# Patient Record
Sex: MALE | Race: WHITE | NOT HISPANIC OR LATINO | Employment: FULL TIME | URBAN - METROPOLITAN AREA
[De-identification: names, ages, dates, MRNs, and addresses within clinical notes are randomized per-mention and may not be internally consistent; named-entity substitution may affect disease eponyms.]

---

## 2022-12-28 ENCOUNTER — PREP FOR SURGERY (OUTPATIENT)
Dept: OTHER | Facility: HOSPITAL | Age: 52
End: 2022-12-28

## 2022-12-28 ENCOUNTER — OFFICE VISIT (OUTPATIENT)
Dept: SURGERY | Facility: CLINIC | Age: 52
End: 2022-12-28

## 2022-12-28 VITALS — WEIGHT: 286 LBS | BODY MASS INDEX: 35.56 KG/M2 | RESPIRATION RATE: 16 BRPM | HEIGHT: 75 IN

## 2022-12-28 DIAGNOSIS — K21.9 GASTROESOPHAGEAL REFLUX DISEASE, UNSPECIFIED WHETHER ESOPHAGITIS PRESENT: ICD-10-CM

## 2022-12-28 DIAGNOSIS — M62.08 RECTUS DIASTASIS: Primary | ICD-10-CM

## 2022-12-28 DIAGNOSIS — K42.9 UMBILICAL HERNIA WITHOUT OBSTRUCTION AND WITHOUT GANGRENE: ICD-10-CM

## 2022-12-28 DIAGNOSIS — K21.9 GERD WITHOUT ESOPHAGITIS: Primary | ICD-10-CM

## 2022-12-28 PROCEDURE — 99203 OFFICE O/P NEW LOW 30 MIN: CPT | Performed by: SURGERY

## 2022-12-28 RX ORDER — PRAMIPEXOLE DIHYDROCHLORIDE 0.5 MG/1
TABLET ORAL
COMMUNITY
Start: 2022-12-11

## 2022-12-28 RX ORDER — PANTOPRAZOLE SODIUM 20 MG/1
20 TABLET, DELAYED RELEASE ORAL DAILY
Qty: 30 TABLET | Refills: 1 | Status: SHIPPED | OUTPATIENT
Start: 2022-12-28 | End: 2022-12-30

## 2022-12-28 RX ORDER — LOSARTAN POTASSIUM 100 MG/1
100 TABLET ORAL DAILY
COMMUNITY
Start: 2020-12-28

## 2022-12-28 RX ORDER — DICLOFENAC SODIUM 75 MG/1
TABLET, DELAYED RELEASE ORAL
COMMUNITY
Start: 2020-12-28

## 2022-12-28 RX ORDER — HYDROCHLOROTHIAZIDE 12.5 MG/1
TABLET ORAL
COMMUNITY
Start: 2022-08-01

## 2022-12-28 RX ORDER — CITALOPRAM 40 MG/1
TABLET ORAL
COMMUNITY
Start: 2020-12-28

## 2022-12-30 DIAGNOSIS — M62.08 RECTUS DIASTASIS: ICD-10-CM

## 2022-12-30 DIAGNOSIS — K21.9 GASTROESOPHAGEAL REFLUX DISEASE, UNSPECIFIED WHETHER ESOPHAGITIS PRESENT: Primary | ICD-10-CM

## 2022-12-30 RX ORDER — PANTOPRAZOLE SODIUM 20 MG/1
20 TABLET, DELAYED RELEASE ORAL DAILY
Qty: 30 TABLET | Refills: 1 | Status: SHIPPED | OUTPATIENT
Start: 2022-12-30 | End: 2023-12-30

## 2022-12-30 NOTE — TELEPHONE ENCOUNTER
I have changed the pharmacy in the patient chart. I called pt to confirm that was the correct pharmacy. Can you please re-send the Protonix. I did pend the medication for you, just as you had it prescribed before.

## 2022-12-30 NOTE — TELEPHONE ENCOUNTER
Hub staff attempted to follow warm transfer process and was unsuccessful     Caller: George Steel    Relationship to patient: Self    Best call back number: [phone]  Patient is needing: PT CALLED STATES WE SEND PRESCRIPTION TO WRONG PHARMACY,  (pantoprazole (Protonix) 20 MG EC tablet [[id number]] (Order [id number])    SEND TO [facility]UB[facility]N [facility] IN [facility] [phone]

## 2022-12-30 NOTE — PROGRESS NOTES
General Surgery/Colorectal Surgery Note    Patient Name:  George Steel  YOB: 1970  [id number]    Referring Provider: Referring, Self      Patient Care Team:  Michael Barnes APRN as PCP - General (Family Medicine)  Raoul Villalpando MD as Consulting Physician (General Surgery)    Chief complaint hernia    Subjective .     History of present illness:   He comes in for 6-month history of a bulge to his upper abdomen.  No pain to the site.  No history of the same.  No previous abdominal surgery.  No tobacco use.  No recent chest pain.  No blood thinner use.  No imaging.  History of reflux causing him to have to sleep sitting up.  He takes Tums with some relief.  Recent Cologuard testing negative.  No previous upper endoscopy.        History:  Past Medical History:   Diagnosis Date   • Hypertension 2020       History reviewed. No pertinent surgical history.    History reviewed. No pertinent family history.    Social History     Tobacco Use   • Smoking status: Never   Substance Use Topics   • Alcohol use: Yes     Alcohol/week: 3.0 standard drinks     Types: 3 Cans of beer per week   • Drug use: Never       Review of Systems  All systems were reviewed and negative except for:   Review of Systems   Constitutional: Negative for chills, fever and unexpected weight loss.   HENT: Negative for congestion, nosebleeds and voice change.    Eyes: Negative for blurred vision, double vision and discharge.   Respiratory: Negative for apnea, chest tightness and shortness of breath.    Cardiovascular: Negative for chest pain and leg swelling.   Gastrointestinal:        See HPI   Endocrine: Negative for cold intolerance and heat intolerance.   Genitourinary: Negative for dysuria, hematuria and urgency.   Musculoskeletal: Negative for back pain, joint swelling and neck pain.   Skin: Negative for color change and dry skin.   Neurological: Negative for dizziness and confusion.   Hematological: Negative for  adenopathy.   Psychiatric/Behavioral: Negative for agitation and behavioral problems.     MEDS:  Prior to Admission medications    Medication Sig Start Date End Date Taking? Authorizing Provider   citalopram (CeleXA) 40 MG tablet  12/28/20  Yes Mian Tran MD   diclofenac (VOLTAREN) 75 MG EC tablet  12/28/20  Yes Provider, MD Mian   hydroCHLOROthiazide (HYDRODIURIL) 12.5 MG tablet  8/1/22  Yes ProviderMian MD   losartan (COZAAR) 100 MG tablet Take 100 mg by mouth Daily. 12/28/20  Yes ProviderMian MD   pramipexole (MIRAPEX) 0.5 MG tablet  12/11/22  Yes ProviderMian MD   pantoprazole (Protonix) 20 MG EC tablet Take 1 tablet by mouth Daily. 12/28/22 12/28/23  Raoul Villalpando MD        Allergies:  Patient has no known allergies.    Objective     Vital Signs        Physical Exam:     General Appearance:    Alert, cooperative, in no acute distress   Head:    Normocephalic, without obvious abnormality, atraumatic   Eyes:          Conjunctivae and sclerae normal, no icterus,     Ears:    Ears appear intact with no abnormalities noted   Throat:   No oral lesions, no thrush, oral mucosa moist   Neck:   No adenopathy, supple, trachea midline, no thyromegaly   Back:     No kyphosis present, no scoliosis present, no skin lesions,      erythema or scars, no tenderness to percussion or                   palpation,   range of motion normal   Lungs:     Clear to auscultation,respirations regular, even and                  unlabored    Heart:    Regular rhythm and normal rate, normal S1 and S2, no            murmur, no gallop, no rub, no click   Chest Wall:    No abnormalities observed   Abdomen:     Normal bowel sounds, no masses, no organomegaly, soft        non-tender, non-distended, no guarding, no rebound                tenderness, reducible umbilical hernia without evidence of obstruction or gangrene, epigastric diastases with no obvious fascial defect   Rectal:       "  Extremities:   Moves all extremities well, no edema, no cyanosis, no             redness   Pulses:   Pulses palpable and equal bilaterally   Skin:   No bleeding, bruising or rash   Lymph nodes:   No palpable adenopathy   Neurologic:   A/o x 4 with no deficits       Results Review:   {Results Review:34479::\"I reviewed the patient's new clinical results.\"    LABS/IMAGING:  No results found for this or any previous visit.     Result Review :     Assessment & Plan     Rectus diastases  Initial umbilical hernia reducible without evidence of obstruction or gangrene  GERD    Discussion with patient and family.  I explained to him what rectus diastases was.  We discussed imaging with CT abdomen without contrast to evaluate for fascial defect as well as possible referral to plastic surgery.  He does not wish to proceed with surgery at this time.  I encouraged him to wear an abdominal binder.  For his umbilical hernia I discussed the warning signs of incarceration and strangulation.  He was instructed to go to emergency department for any concern.  We discussed observation versus surgery.  He wishes to proceed with observation.  For his reflux I will give him a prescription for Protonix.  I recommended upper endoscopy to evaluate his reflux.  Benefits and alternatives discussed.  Risk procedure including bleeding and perforation discussed.  He agrees with the plan.  Orders placed.  Thank for the consult.             This document has been electronically signed by Raoul Villalpando MD  December 30, 2022 09:17 EST  "

## 2023-01-04 DIAGNOSIS — M62.08 RECTUS DIASTASIS: Primary | ICD-10-CM

## 2023-01-26 ENCOUNTER — TELEPHONE (OUTPATIENT)
Dept: SURGERY | Facility: CLINIC | Age: 53
End: 2023-01-26
Payer: COMMERCIAL

## 2023-01-26 NOTE — TELEPHONE ENCOUNTER
ALEX CALLED FROM [facility].  PATIENT IS SCHEDULED FOR A CT PELVIS ON 01/27/23.  SHE SAID THE PROCEDURE IS APPROVED BUT THE SITE IS STILL PENDING.    IF IT IS NOT APPROVED BY TODAY AT 3:00, CAN IT BE RESCHEDULED?

## 2023-03-14 ENCOUNTER — TELEPHONE (OUTPATIENT)
Dept: SURGERY | Facility: CLINIC | Age: 53
End: 2023-03-14
Payer: COMMERCIAL

## 2023-03-14 NOTE — TELEPHONE ENCOUNTER
Patient wife called wanted to r/s patients egd. Was able to speak with amarilis in surgery scheduling to move patient.

## 2023-04-25 ENCOUNTER — TELEPHONE (OUTPATIENT)
Dept: SURGERY | Facility: CLINIC | Age: 53
End: 2023-04-25
Payer: COMMERCIAL

## 2023-04-25 NOTE — TELEPHONE ENCOUNTER
Called and spoke with patient and let her know that nettie was scheduled for the EGD to evaluate his reflux and the CT scan was to evaluate his hernia. Advised to keep the EGD and proceed with CT scan and Dr. Villalpando can review results from both at patients F/U visit. Pt wife v/u and had no further questions. Will call when CT is scheduled.

## 2023-04-25 NOTE — TELEPHONE ENCOUNTER
Scheduled patients ct scan on 5/16 @ 5:30 p.m with a 515 arrival time to the hospital. Advised to bring insurance card and photo id. Information relayed to kit with no further questions or concerns.

## 2023-04-25 NOTE — TELEPHONE ENCOUNTER
PATIENT'S WIFE CALLED AND WANTS TO KNOW IF PATIENT NEEDS CT PRIOR TO SCOPE.  SHE SAID SHE HAD PREVIOUSLY CALLED THE OFFICE AND ASKED FOR THE CT AND THE SCOPE TO BE RESCHEDULED TO [place].      SHE SAID PATIENT WAS TO HAVE CT, FOLLOW UP WITH DR. NEGRETE, THEN HAVE THE SCOPE.  DOES SHE NEED TO CALL AND RESCHEDULE THE CT, AND THEN CALL BACK AND RESCHEDULE THE SCOPE?    PLEASE REVIEW PREVIOUS PHONE ENCOUNTERS AND MY CHART MESSAGES.      SHE SAID SHE APOLOGIZES, BUT SHE CALLED, PREVIOUSLY.  SHE HAD MEDICAL ISSUE HERSELF, WHERE THEY HAD BEEN UNABLE TO DO ANYTHING.

## 2023-05-01 ENCOUNTER — PREP FOR SURGERY (OUTPATIENT)
Dept: OTHER | Facility: HOSPITAL | Age: 53
End: 2023-05-01
Payer: COMMERCIAL

## 2023-05-02 ENCOUNTER — ANESTHESIA (OUTPATIENT)
Dept: GASTROENTEROLOGY | Facility: HOSPITAL | Age: 53
End: 2023-05-02
Payer: COMMERCIAL

## 2023-05-02 ENCOUNTER — HOSPITAL ENCOUNTER (OUTPATIENT)
Facility: HOSPITAL | Age: 53
Setting detail: HOSPITAL OUTPATIENT SURGERY
Discharge: HOME | End: 2023-05-02
Attending: SURGERY | Admitting: SURGERY
Payer: COMMERCIAL

## 2023-05-02 ENCOUNTER — ANESTHESIA EVENT (OUTPATIENT)
Dept: GASTROENTEROLOGY | Facility: HOSPITAL | Age: 53
End: 2023-05-02
Payer: COMMERCIAL

## 2023-05-02 VITALS
DIASTOLIC BLOOD PRESSURE: 55 MMHG | HEIGHT: 76 IN | SYSTOLIC BLOOD PRESSURE: 132 MMHG | HEART RATE: 66 BPM | OXYGEN SATURATION: 98 % | RESPIRATION RATE: 18 BRPM | BODY MASS INDEX: 34.63 KG/M2 | WEIGHT: 284.39 LBS | TEMPERATURE: 97.6 F

## 2023-05-02 DIAGNOSIS — K21.9 GERD WITHOUT ESOPHAGITIS: ICD-10-CM

## 2023-05-02 PROCEDURE — 88305 TISSUE EXAM BY PATHOLOGIST: CPT | Performed by: SURGERY

## 2023-05-02 PROCEDURE — 25010000002 PROPOFOL 10 MG/ML EMULSION: Performed by: NURSE ANESTHETIST, CERTIFIED REGISTERED

## 2023-05-02 RX ORDER — PROPOFOL 10 MG/ML
VIAL (ML) INTRAVENOUS AS NEEDED
Status: DISCONTINUED | OUTPATIENT
Start: 2023-05-02 | End: 2023-05-02 | Stop reason: SURG

## 2023-05-02 RX ORDER — LIDOCAINE HYDROCHLORIDE 20 MG/ML
INJECTION, SOLUTION EPIDURAL; INFILTRATION; INTRACAUDAL; PERINEURAL AS NEEDED
Status: DISCONTINUED | OUTPATIENT
Start: 2023-05-02 | End: 2023-05-02 | Stop reason: SURG

## 2023-05-02 RX ORDER — SODIUM CHLORIDE, SODIUM LACTATE, POTASSIUM CHLORIDE, CALCIUM CHLORIDE 600; 310; 30; 20 MG/100ML; MG/100ML; MG/100ML; MG/100ML
30 INJECTION, SOLUTION INTRAVENOUS CONTINUOUS
Status: DISCONTINUED | OUTPATIENT
Start: 2023-05-02 | End: 2023-05-02 | Stop reason: HOSPADM

## 2023-05-02 RX ADMIN — PROPOFOL 100 MG: 10 INJECTION, EMULSION INTRAVENOUS at 12:03

## 2023-05-02 RX ADMIN — SODIUM CHLORIDE, POTASSIUM CHLORIDE, SODIUM LACTATE AND CALCIUM CHLORIDE 30 ML/HR: 600; 310; 30; 20 INJECTION, SOLUTION INTRAVENOUS at 10:51

## 2023-05-02 RX ADMIN — LIDOCAINE HYDROCHLORIDE 50 MG: 20 INJECTION, SOLUTION EPIDURAL; INFILTRATION; INTRACAUDAL; PERINEURAL at 11:59

## 2023-05-02 RX ADMIN — PROPOFOL 250 MCG/KG/MIN: 10 INJECTION, EMULSION INTRAVENOUS at 11:59

## 2023-05-02 RX ADMIN — PROPOFOL 200 MG: 10 INJECTION, EMULSION INTRAVENOUS at 11:59

## 2023-05-02 NOTE — H&P
General Surgery/Colorectal Surgery Note    Patient Name:  George Steel  YOB: 1970  [id number]    Referring Provider: Raoul Villalpando, *      Patient Care Team:  Michael Barnes APRN as PCP - General (Family Medicine)  Raoul Villalpando MD as Consulting Physician (General Surgery)    Subjective .     History of present illness:    HPI   History of reflux causing him to have to sleep sitting up.  He takes Tums with some relief.  No previous upper endoscopy.    History:  Past Medical History:   Diagnosis Date   • Hypertension 2020       No past surgical history on file.    No family history on file.    Social History     Tobacco Use   • Smoking status: Never   Substance Use Topics   • Alcohol use: Yes     Alcohol/week: 3.0 standard drinks     Types: 3 Cans of beer per week   • Drug use: Never       Review of Systems: See HPI    Review of Systems            Medications Prior to Admission   Medication Sig Dispense Refill Last Dose   • citalopram (CeleXA) 40 MG tablet       • diclofenac (VOLTAREN) 75 MG EC tablet       • hydroCHLOROthiazide (HYDRODIURIL) 12.5 MG tablet       • losartan (COZAAR) 100 MG tablet Take 100 mg by mouth Daily.      • pantoprazole (Protonix) 20 MG EC tablet Take 1 tablet by mouth Daily. 30 tablet 1    • pramipexole (MIRAPEX) 0.5 MG tablet             Home Meds:      Prior to Admission medications    Medication Sig Start Date End Date Taking? Authorizing Provider   citalopram (CeleXA) 40 MG tablet  12/28/20   Mian Tran MD   diclofenac (VOLTAREN) 75 MG EC tablet  12/28/20   ProviderMian MD   hydroCHLOROthiazide (HYDRODIURIL) 12.5 MG tablet  8/1/22   Mian Tran MD   losartan (COZAAR) 100 MG tablet Take 100 mg by mouth Daily. 12/28/20   Mian Tran MD   pantoprazole (Protonix) 20 MG EC tablet Take 1 tablet by mouth Daily. 12/30/22 12/30/23  Raoul Villalpando MD   pramipexole (MIRAPEX) 0.5 MG tablet  12/11/22   Provider  Historical, MD            Allergies:  Patient has no known allergies.      Objective     Vital Signs        Physical Exam:     Physical Exam    NAD, A/O x 4, normal circulation, normal respiration      Result Review :     Assessment & Plan     There are no diagnoses linked to this encounter.       Risks including bleeding, perforation, pain, infection, missed polyp(s). Benefits, and alternatives of EGD discussed with patient.  All questions answered.  Consent verified.         Raoul Villalpando MD  05/02/23 10:30 EDT

## 2023-05-02 NOTE — ANESTHESIA PREPROCEDURE EVALUATION
Anesthesia Evaluation     Patient summary reviewed and Nursing notes reviewed   no history of anesthetic complications:  NPO Solid Status: > 8 hours  NPO Liquid Status: > 2 hours           Airway   Mallampati: II  TM distance: >3 FB  Neck ROM: full  No difficulty expected  Dental      Pulmonary - negative pulmonary ROS and normal exam    breath sounds clear to auscultation  Cardiovascular - normal exam  Exercise tolerance: good (4-7 METS)    Rhythm: regular  Rate: normal    (+) hypertension,       Neuro/Psych- negative ROS  GI/Hepatic/Renal/Endo    (+)  GERD well controlled,      Musculoskeletal (-) negative ROS    Abdominal    Substance History - negative use     OB/GYN negative ob/gyn ROS         Other - negative ROS       ROS/Med Hx Other: PAT Nursing Notes unavailable.                   Anesthesia Plan    ASA 2     general     (Total IV Anesthesia    Patient understands anesthesia not responsible for dental damage.  )  intravenous induction     Anesthetic plan, risks, benefits, and alternatives have been provided, discussed and informed consent has been obtained with: patient.  Pre-procedure education provided  Plan discussed with CRNA.        CODE STATUS:

## 2023-05-02 NOTE — ANESTHESIA POSTPROCEDURE EVALUATION
Patient: George Steel    Procedure Summary     Date: 05/02/23 Room / Location: [facility] ENDOSCOPY 2 / [facility] ENDOSCOPY    Anesthesia Start: 1153 Anesthesia Stop: 1213    Procedure: ESOPHAGOGASTRODUODENOSCOPY WITH BIOPSIES Diagnosis:       GERD without esophagitis      (GERD without esophagitis [K21.9])    Surgeons: Raoul Villalpando MD Provider: Christian Cano MD    Anesthesia Type: general ASA Status: 2          Anesthesia Type: general    Vitals  Vitals Value Taken Time   /55 05/02/23 1227   Temp 36.4 °C (97.6 °F) 05/02/23 1232   Pulse 66 05/02/23 1232   Resp 18 05/02/23 1232   SpO2 98 % 05/02/23 1232           Post Anesthesia Care and Evaluation    Patient location during evaluation: bedside  Patient participation: complete - patient participated  Level of consciousness: awake  Pain management: adequate    Airway patency: patent  PONV Status: none  Cardiovascular status: acceptable and stable  Respiratory status: acceptable  Hydration status: acceptable    Comments: An Anesthesiologist personally participated in the most demanding procedures (including induction and emergence if applicable) in the anesthesia plan, monitored the course of anesthesia administration at frequent intervals and remained physically present and available for immediate diagnosis and treatment of emergencies.

## 2023-05-03 LAB
CYTO UR: NORMAL
LAB AP CASE REPORT: NORMAL
LAB AP CLINICAL INFORMATION: NORMAL
PATH REPORT.FINAL DX SPEC: NORMAL
PATH REPORT.GROSS SPEC: NORMAL

## 2023-05-16 ENCOUNTER — HOSPITAL ENCOUNTER (OUTPATIENT)
Dept: CT IMAGING | Facility: HOSPITAL | Age: 53
Discharge: HOME | End: 2023-05-16
Admitting: SURGERY
Payer: COMMERCIAL

## 2023-05-16 DIAGNOSIS — M62.08 RECTUS DIASTASIS: ICD-10-CM

## 2023-05-16 PROCEDURE — 74150 CT ABDOMEN W/O CONTRAST: CPT

## 2023-05-18 ENCOUNTER — OFFICE VISIT (OUTPATIENT)
Dept: SURGERY | Facility: CLINIC | Age: 53
End: 2023-05-18
Payer: COMMERCIAL

## 2023-05-18 VITALS — BODY MASS INDEX: 36.07 KG/M2 | HEIGHT: 76 IN | WEIGHT: 296.2 LBS

## 2023-05-18 DIAGNOSIS — K21.00 GASTROESOPHAGEAL REFLUX DISEASE WITH ESOPHAGITIS WITHOUT HEMORRHAGE: ICD-10-CM

## 2023-05-18 DIAGNOSIS — K42.9 UMBILICAL HERNIA WITHOUT OBSTRUCTION AND WITHOUT GANGRENE: Primary | ICD-10-CM

## 2023-05-19 NOTE — PROGRESS NOTES
General Surgery/Colorectal Surgery Note    Patient Name:  George Steel  YOB: 1970  [id number]    Referring Provider: No ref. provider found      Patient Care Team:  Michael Barnes APRN as PCP - General (Family Medicine)  Raoul Villalpando MD as Consulting Physician (General Surgery)    Chief complaint follow-up    Subjective .     History of present illness:    Previously seen. 6-month history of a bulge to his upper abdomen.  No pain to the site.  No history of the same.  No previous abdominal surgery.  No tobacco use.  No recent chest pain.  No blood thinner use.  No imaging.  History of reflux causing him to have to sleep sitting up.  He takes Tums with some relief.  Recent Cologuard testing negative.  No previous upper endoscopy.    CT abdomen pelvis 5/16/2023 with rectus diastases with a 2 cm umbilical hernia containing small bowel loops, small bilateral inguinal hernias containing fat    Status post EGD 5/2/2023 with small hiatal hernia, grade a esophagitis  Biopsy duodenum with chronic duodenitis, antral biopsy negative for H. pylori, GE junction with erosion and reflux esophagitis negative for metaplasia    He was started on Protonix.    He comes in for follow-up.  His reflux is much improved on his reflux medication.  He wishes to discuss umbilical hernia repair.  No changes in health or medications since last seen.        History:  Past Medical History:   Diagnosis Date   • Hypertension 2020       Past Surgical History:   Procedure Laterality Date   • ENDOSCOPY N/A 5/2/2023    Procedure: ESOPHAGOGASTRODUODENOSCOPY WITH BIOPSIES;  Surgeon: Raoul Villalpando MD;  Location: [facility] ENDOSCOPY;  Service: General;  Laterality: N/A;  SAME       Family History   Problem Relation Age of Onset   • Diabetes Mother    • Diabetes Father        Social History     Tobacco Use   • Smoking status: Never   • Smokeless tobacco: Former   Vaping Use   • Vaping Use: Never used   Substance Use  Topics   • Alcohol use: Yes     Alcohol/week: 3.0 standard drinks     Types: 3 Cans of beer per week   • Drug use: Never       Review of Systems  All systems were reviewed and negative except for:   Review of Systems   Constitutional: Negative for chills, fever and unexpected weight loss.   HENT: Negative for congestion, nosebleeds and voice change.    Eyes: Negative for blurred vision, double vision and discharge.   Respiratory: Negative for apnea, chest tightness and shortness of breath.    Cardiovascular: Negative for chest pain and leg swelling.   Gastrointestinal:        See HPI   Endocrine: Negative for cold intolerance and heat intolerance.   Genitourinary: Negative for dysuria, hematuria and urgency.   Musculoskeletal: Negative for back pain, joint swelling and neck pain.   Skin: Negative for color change and dry skin.   Neurological: Negative for dizziness and confusion.   Hematological: Negative for adenopathy.   Psychiatric/Behavioral: Negative for agitation and behavioral problems.     MEDS:  Prior to Admission medications    Medication Sig Start Date End Date Taking? Authorizing Provider   citalopram (CeleXA) 40 MG tablet  12/28/20  Yes ProviderMian MD   diclofenac (VOLTAREN) 75 MG EC tablet  12/28/20  Yes Mian Tran MD   hydroCHLOROthiazide (HYDRODIURIL) 12.5 MG tablet  8/1/22  Yes ProviderMian MD   losartan (COZAAR) 100 MG tablet Take 1 tablet by mouth Daily. 12/28/20  Yes Mian Tran MD   pantoprazole (Protonix) 20 MG EC tablet Take 1 tablet by mouth Daily. 12/30/22 12/30/23 Yes Raoul Villalpando MD   pramipexole (MIRAPEX) 0.5 MG tablet  12/11/22  Yes ProviderMian MD        Allergies:  Patient has no known allergies.    Objective     Vital Signs        Physical Exam:     General Appearance:    Alert, cooperative, in no acute distress   Head:    Normocephalic, without obvious abnormality, atraumatic   Eyes:          Conjunctivae and sclerae normal,  "no icterus,     Ears:    Ears appear intact with no abnormalities noted   Throat:   No oral lesions, no thrush, oral mucosa moist   Neck:   No adenopathy, supple, trachea midline, no thyromegaly   Back:     No kyphosis present, no scoliosis present, no skin lesions,      erythema or scars, no tenderness to percussion or                   palpation,   range of motion normal   Lungs:     Clear to auscultation,respirations regular, even and                  unlabored    Heart:    Regular rhythm and normal rate, normal S1 and S2, no            murmur, no gallop, no rub, no click   Chest Wall:    No abnormalities observed   Abdomen:     Normal bowel sounds, no masses, no organomegaly, soft        non-tender, non-distended, no guarding, no rebound                tenderness, reducible umbilical hernia without evidence of obstruction or gangrene   Rectal:        Extremities:   Moves all extremities well, no edema, no cyanosis, no             redness   Pulses:   Pulses palpable and equal bilaterally   Skin:   No bleeding, bruising or rash   Lymph nodes:   No palpable adenopathy   Neurologic:   A/o x 4 with no deficits       Results Review:   {Results Review:20512::\"I reviewed the patient's new clinical results.\"    LABS/IMAGING:  Results for orders placed or performed during the hospital encounter of 05/02/23   Tissue Pathology Exam    Specimen: A: Small Intestine, Duodenum; Tissue    B: Gastric, Antrum; Tissue    C: GE Junction; Tissue   Result Value Ref Range    Case Report       Surgical Pathology Report                         Case: [id number]                                  Authorizing Provider:  Raoul Villalpando MD  Collected:           05/02/2023 12:01 PM          Ordering Location:     [facility] Received:            05/02/2023 12:59 PM                                 SUITES                                                                       Pathologist:           Xiomara Ferrara MD     " "                                                Specimens:   1) - Small Intestine, Duodenum, DUODENUM BIOPSIES                                                   2) - Gastric, Antrum, ANTRUM BIOPSIES                                                               3) - GE Junction, GE JUNCTION BIOPSIES                                                     Clinical Information       GERD without esophagitis      Final Diagnosis       1. Duodenum, biopsy:   - Chronic duodenitis      2. Stomach, antrum, biopsy:   - Gastric antrum mucosa with no significant pathologic change   - Negative for Helicobacter pylori on routine H&E stain   - Negative for intestinal metaplasia, dysplasia and malignancy      3. Gastroesophageal junction, biopsy:   - Squamocolumnar mucosa with superficial erosion and changes consistent with reflux esophagitis   - Negative for intestinal metaplasia, dysplasia and malignancy          Gross Description       1. Small Intestine, Duodenum.  Received in formalin and labeled \" duodenum\" is a 0.5 cm fragment of tan soft tissue. The specimen is entirely submitted in one cassette.    2. Gastric, Antrum.  Received in formalin and labeled \" antrum\" is a 0.6 cm fragment of tan soft tissue. The specimen is entirely submitted in one cassette.    3. GE Junction.  Received in formalin and labeled \" GE junction\" are two fragments of tan soft tissue measuring 0.3-0.4 cm in greatest dimension. The specimen is entirely submitted in one cassette.   DORY      Microscopic Description       Microscopic examination performed.          Result Review :     Assessment & Plan     CT abdomen pelvis 5/16/2023 with rectus diastases with a 2 cm umbilical hernia containing small bowel loops, small bilateral inguinal hernias containing fat  Initial reducible umbilical hernia without evidence of obstruction or gangrene  Hiatal hernia with GERD and esophagitis  Status post EGD 5/2/2023 with small hiatal hernia, grade a esophagitis  Biopsy " duodenum with chronic duodenitis, antral biopsy negative for H. pylori, GE junction with erosion and reflux esophagitis negative for metaplasia    Discussion with patient.  I reviewed the CT images with the patient.  I discussed what a hernia was.  I discussed the warning signs of incarceration and strangulation.  He was instructed to the emergency department for any concern.  He wishes to have this repaired.  I recommended robotic possible open umbilical hernia repair with mesh.  I explained the procedure and recovery.  Benefits and alternatives discussed.  Risk procedure including risk of anesthesia, bleeding, infection, mesh infection, conversion to open, recurrence of hernia, damage to surrounding structures including bowel, heart attack, stroke, blood clot, pneumonia were discussed.  All questions answered.  He agrees with the plan.  Orders placed.  He was instructed to use chlorhexidine the night before surgery.  I reviewed the findings of the upper endoscopy and pathology.  He is pleased on his reflux surgery.  I explained to him if his reflux worsens we need to consider reflux surgery.  When he has his next colonoscopy I recommended repeat upper endoscopy for further evaluation.  All questions answered.  He agrees with the plan.  Thank for the consult.           This document has been electronically signed by Raoul Villalpando MD  May 19, 2023 09:00 EDT

## 2023-06-12 ENCOUNTER — TELEPHONE (OUTPATIENT)
Dept: SURGERY | Facility: CLINIC | Age: 53
End: 2023-06-12
Payer: COMMERCIAL

## 2023-06-12 NOTE — TELEPHONE ENCOUNTER
PATIENT'S WIFE, CARMELA, CALLED TO SCHEDULE HERNIA REPAIR.  PATIENT SAW DR. NEGRETE A MONTH AGO, AND WAS TOLD TO CALL BACK TO SCHEDULE WITHIN 3 MONTHS DUE TO INSURANCE REASONS.

## 2023-06-13 ENCOUNTER — PREP FOR SURGERY (OUTPATIENT)
Dept: SURGERY | Facility: CLINIC | Age: 53
End: 2023-06-13
Payer: COMMERCIAL

## 2023-06-13 DIAGNOSIS — K42.9 UMBILICAL HERNIA WITHOUT OBSTRUCTION AND WITHOUT GANGRENE: Primary | ICD-10-CM

## 2023-06-13 RX ORDER — SODIUM CHLORIDE, SODIUM LACTATE, POTASSIUM CHLORIDE, CALCIUM CHLORIDE 600; 310; 30; 20 MG/100ML; MG/100ML; MG/100ML; MG/100ML
50 INJECTION, SOLUTION INTRAVENOUS CONTINUOUS
OUTPATIENT
Start: 2023-06-13

## 2023-06-13 RX ORDER — SODIUM CHLORIDE 0.9 % (FLUSH) 0.9 %
10 SYRINGE (ML) INJECTION EVERY 12 HOURS SCHEDULED
OUTPATIENT
Start: 2023-06-13

## 2023-06-13 RX ORDER — SODIUM CHLORIDE 0.9 % (FLUSH) 0.9 %
10 SYRINGE (ML) INJECTION AS NEEDED
OUTPATIENT
Start: 2023-06-13

## 2023-06-13 RX ORDER — SODIUM CHLORIDE 9 MG/ML
40 INJECTION, SOLUTION INTRAVENOUS AS NEEDED
OUTPATIENT
Start: 2023-06-13

## 2023-06-13 NOTE — TELEPHONE ENCOUNTER
Called and spoke with patient and scheduled his surgery for 7/14/23. Spoke with justine in scheduling, patient will have a phone PAT. Advised nothing to eat or drink after midnight. He will need a  home. They will call with arrival time the day before surgery. Pt v/u and had no further questions.

## 2023-08-01 ENCOUNTER — OFFICE VISIT (OUTPATIENT)
Dept: SURGERY | Facility: CLINIC | Age: 53
End: 2023-08-01
Payer: COMMERCIAL

## 2023-08-01 VITALS — RESPIRATION RATE: 16 BRPM | WEIGHT: 290 LBS | BODY MASS INDEX: 36.06 KG/M2 | HEIGHT: 75 IN

## 2023-08-01 DIAGNOSIS — K42.9 UMBILICAL HERNIA WITHOUT OBSTRUCTION AND WITHOUT GANGRENE: Primary | ICD-10-CM

## 2023-08-01 PROCEDURE — 99024 POSTOP FOLLOW-UP VISIT: CPT | Performed by: SURGERY

## 2023-11-04 ENCOUNTER — APPOINTMENT (OUTPATIENT)
Dept: GENERAL RADIOLOGY | Facility: HOSPITAL | Age: 53
End: 2023-11-04
Payer: COMMERCIAL

## 2023-11-04 ENCOUNTER — HOSPITAL ENCOUNTER (EMERGENCY)
Facility: HOSPITAL | Age: 53
Discharge: HOME | End: 2023-11-04
Attending: EMERGENCY MEDICINE | Admitting: EMERGENCY MEDICINE
Payer: COMMERCIAL

## 2023-11-04 VITALS
OXYGEN SATURATION: 98 % | TEMPERATURE: 97.9 F | WEIGHT: 299.38 LBS | DIASTOLIC BLOOD PRESSURE: 87 MMHG | HEIGHT: 75 IN | SYSTOLIC BLOOD PRESSURE: 139 MMHG | RESPIRATION RATE: 12 BRPM | HEART RATE: 61 BPM | BODY MASS INDEX: 37.22 KG/M2

## 2023-11-04 DIAGNOSIS — M94.0 COSTOCHONDRITIS: Primary | ICD-10-CM

## 2023-11-04 DIAGNOSIS — R07.9 CHEST PAIN, UNSPECIFIED TYPE: ICD-10-CM

## 2023-11-04 LAB
ALBUMIN SERPL-MCNC: 4.6 G/DL (ref 3.5–5.2)
ALBUMIN/GLOB SERPL: 1.8 G/DL
ALP SERPL-CCNC: 92 U/L (ref 39–117)
ALT SERPL W P-5'-P-CCNC: 56 U/L (ref 1–41)
ANION GAP SERPL CALCULATED.3IONS-SCNC: 8 MMOL/L (ref 5–15)
AST SERPL-CCNC: 26 U/L (ref 1–40)
BASOPHILS # BLD AUTO: 0.06 10*3/MM3 (ref 0–0.2)
BASOPHILS NFR BLD AUTO: 0.8 % (ref 0–1.5)
BILIRUB SERPL-MCNC: 0.4 MG/DL (ref 0–1.2)
BUN SERPL-MCNC: 19 MG/DL (ref 6–20)
BUN/CREAT SERPL: 19.2 (ref 7–25)
CALCIUM SPEC-SCNC: 9.3 MG/DL (ref 8.6–10.5)
CHLORIDE SERPL-SCNC: 105 MMOL/L (ref 98–107)
CO2 SERPL-SCNC: 28 MMOL/L (ref 22–29)
CREAT SERPL-MCNC: 0.99 MG/DL (ref 0.76–1.27)
DEPRECATED RDW RBC AUTO: 41.1 FL (ref 37–54)
EGFRCR SERPLBLD CKD-EPI 2021: 90 ML/MIN/1.73
EOSINOPHIL # BLD AUTO: 0.09 10*3/MM3 (ref 0–0.4)
EOSINOPHIL NFR BLD AUTO: 1.2 % (ref 0.3–6.2)
ERYTHROCYTE [DISTWIDTH] IN BLOOD BY AUTOMATED COUNT: 12.8 % (ref 12.3–15.4)
GLOBULIN UR ELPH-MCNC: 2.6 GM/DL
GLUCOSE SERPL-MCNC: 173 MG/DL (ref 65–99)
HCT VFR BLD AUTO: 43 % (ref 37.5–51)
HGB BLD-MCNC: 14.2 G/DL (ref 13–17.7)
HOLD SPECIMEN: NORMAL
HOLD SPECIMEN: NORMAL
IMM GRANULOCYTES # BLD AUTO: 0.04 10*3/MM3 (ref 0–0.05)
IMM GRANULOCYTES NFR BLD AUTO: 0.5 % (ref 0–0.5)
LIPASE SERPL-CCNC: 32 U/L (ref 13–60)
LYMPHOCYTES # BLD AUTO: 1.54 10*3/MM3 (ref 0.7–3.1)
LYMPHOCYTES NFR BLD AUTO: 21.1 % (ref 19.6–45.3)
MAGNESIUM SERPL-MCNC: 2 MG/DL (ref 1.6–2.6)
MCH RBC QN AUTO: 28.9 PG (ref 26.6–33)
MCHC RBC AUTO-ENTMCNC: 33 G/DL (ref 31.5–35.7)
MCV RBC AUTO: 87.6 FL (ref 79–97)
MONOCYTES # BLD AUTO: 0.25 10*3/MM3 (ref 0.1–0.9)
MONOCYTES NFR BLD AUTO: 3.4 % (ref 5–12)
NEUTROPHILS NFR BLD AUTO: 5.32 10*3/MM3 (ref 1.7–7)
NEUTROPHILS NFR BLD AUTO: 73 % (ref 42.7–76)
NRBC BLD AUTO-RTO: 0 /100 WBC (ref 0–0.2)
NT-PROBNP SERPL-MCNC: 76.3 PG/ML (ref 0–900)
PLATELET # BLD AUTO: 386 10*3/MM3 (ref 140–450)
PMV BLD AUTO: 9.2 FL (ref 6–12)
POTASSIUM SERPL-SCNC: 4.5 MMOL/L (ref 3.5–5.2)
PROT SERPL-MCNC: 7.2 G/DL (ref 6–8.5)
QT INTERVAL: 424 MS
QT INTERVAL: 440 MS
QTC INTERVAL: 432 MS
QTC INTERVAL: 441 MS
RBC # BLD AUTO: 4.91 10*6/MM3 (ref 4.14–5.8)
SODIUM SERPL-SCNC: 141 MMOL/L (ref 136–145)
TROPONIN T SERPL HS-MCNC: 10 NG/L
WBC NRBC COR # BLD: 7.3 10*3/MM3 (ref 3.4–10.8)
WHOLE BLOOD HOLD COAG: NORMAL
WHOLE BLOOD HOLD SPECIMEN: NORMAL

## 2023-11-04 PROCEDURE — 25010000002 KETOROLAC TROMETHAMINE PER 15 MG

## 2023-11-04 PROCEDURE — 36415 COLL VENOUS BLD VENIPUNCTURE: CPT

## 2023-11-04 PROCEDURE — 83880 ASSAY OF NATRIURETIC PEPTIDE: CPT

## 2023-11-04 PROCEDURE — 99284 EMERGENCY DEPT VISIT MOD MDM: CPT

## 2023-11-04 PROCEDURE — 83735 ASSAY OF MAGNESIUM: CPT

## 2023-11-04 PROCEDURE — 93005 ELECTROCARDIOGRAM TRACING: CPT | Performed by: EMERGENCY MEDICINE

## 2023-11-04 PROCEDURE — 83690 ASSAY OF LIPASE: CPT

## 2023-11-04 PROCEDURE — 96374 THER/PROPH/DIAG INJ IV PUSH: CPT

## 2023-11-04 PROCEDURE — 71045 X-RAY EXAM CHEST 1 VIEW: CPT

## 2023-11-04 PROCEDURE — 84484 ASSAY OF TROPONIN QUANT: CPT

## 2023-11-04 PROCEDURE — 93005 ELECTROCARDIOGRAM TRACING: CPT

## 2023-11-04 PROCEDURE — 85025 COMPLETE CBC W/AUTO DIFF WBC: CPT

## 2023-11-04 PROCEDURE — 80053 COMPREHEN METABOLIC PANEL: CPT

## 2023-11-04 RX ORDER — ASPIRIN 81 MG/1
324 TABLET, CHEWABLE ORAL ONCE
Status: DISCONTINUED | OUTPATIENT
Start: 2023-11-04 | End: 2023-11-04 | Stop reason: HOSPADM

## 2023-11-04 RX ORDER — SODIUM CHLORIDE 0.9 % (FLUSH) 0.9 %
10 SYRINGE (ML) INJECTION AS NEEDED
Status: DISCONTINUED | OUTPATIENT
Start: 2023-11-04 | End: 2023-11-04 | Stop reason: HOSPADM

## 2023-11-04 RX ORDER — KETOROLAC TROMETHAMINE 15 MG/ML
30 INJECTION, SOLUTION INTRAMUSCULAR; INTRAVENOUS ONCE
Status: COMPLETED | OUTPATIENT
Start: 2023-11-04 | End: 2023-11-04

## 2023-11-04 RX ADMIN — KETOROLAC TROMETHAMINE 30 MG: 15 INJECTION, SOLUTION INTRAMUSCULAR; INTRAVENOUS at 15:14
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
## 2023-12-18 ENCOUNTER — PATIENT ROUNDING (BHMG ONLY) (OUTPATIENT)
Dept: CARDIOLOGY | Facility: CLINIC | Age: 53
End: 2023-12-18
Payer: COMMERCIAL

## 2023-12-18 ENCOUNTER — OFFICE VISIT (OUTPATIENT)
Dept: CARDIOLOGY | Facility: CLINIC | Age: 53
End: 2023-12-18
Payer: COMMERCIAL

## 2023-12-18 VITALS
WEIGHT: 298 LBS | DIASTOLIC BLOOD PRESSURE: 87 MMHG | HEIGHT: 75 IN | HEART RATE: 68 BPM | SYSTOLIC BLOOD PRESSURE: 130 MMHG | BODY MASS INDEX: 37.05 KG/M2

## 2023-12-18 DIAGNOSIS — R07.2 PRECORDIAL PAIN: ICD-10-CM

## 2023-12-18 DIAGNOSIS — I10 HYPERTENSION, ESSENTIAL: ICD-10-CM

## 2023-12-18 DIAGNOSIS — G47.33 OSA (OBSTRUCTIVE SLEEP APNEA): Primary | ICD-10-CM

## 2023-12-18 DIAGNOSIS — G47.10 HYPERSOMNOLENCE: ICD-10-CM

## 2023-12-18 PROCEDURE — 99204 OFFICE O/P NEW MOD 45 MIN: CPT | Performed by: INTERNAL MEDICINE

## 2023-12-18 RX ORDER — ASPIRIN 325 MG
325 TABLET, DELAYED RELEASE (ENTERIC COATED) ORAL EVERY 6 HOURS PRN
COMMUNITY

## 2023-12-18 RX ORDER — HYDROCHLOROTHIAZIDE 25 MG/1
25 TABLET ORAL DAILY
Qty: 90 TABLET | Refills: 3 | Status: SHIPPED | OUTPATIENT
Start: 2023-12-18

## 2023-12-18 NOTE — PROGRESS NOTES
Chief Complaint  Chest Pain    Subjective            George Steel presents to [facility] CARDIOLOGY  Chest Pain   Associated symptoms include malaise/fatigue.       53-year-old white male.  He has no previous cardiac problems.  His mother does have a history of early CAD.  He recently had an episode of bilateral arm pain with burning chest discomfort.  It was moderately intense.  At that time his blood pressure was extremely elevated.  Prior to that he had also had another unrelated episode of blurry vision and headache.  For the chest discomfort he was brought to the emergency room.  His blood pressure had improved somewhat by the time he got to the emergency room.  His EKG and biomarkers were negative.  He reports hypersomnolence.  He has known sleep apnea but according to his wife his CPAP is ineffective and he continues to have snoring and episodes of witnessed apnea despite that.  He has hypersomnolence, drowsiness while driving.    PMH  Past Medical History:   Diagnosis Date    Hypertension 2020    Restless leg     Sleep apnea     Ventral hernia     UMBILICAL AND VENTRAL         SURGICALHX  Past Surgical History:   Procedure Laterality Date    ENDOSCOPY N/A 05/02/2023    Procedure: ESOPHAGOGASTRODUODENOSCOPY WITH BIOPSIES;  Surgeon: Raoul Villalpando MD;  Location: [facility] ENDOSCOPY;  Service: General;  Laterality: N/A;  SAME    FOOT SURGERY Left     ACHILLES TENDON    VENTRAL HERNIA REPAIR N/A 7/12/2023    Procedure: UMBILICAL HERNIA REPAIR LAPAROSCOPIC WITH DAVINCI ROBOT with mesh;  Surgeon: Raoul Villalpando MD;  Location: [facility] MAIN OR;  Service: Robotics - DaVinci;  Laterality: N/A;        SOC  Social History     Socioeconomic History    Marital status:    Tobacco Use    Smoking status: Never    Smokeless tobacco: Former   Vaping Use    Vaping Use: Never used   Substance and Sexual Activity    Alcohol use: Yes     Alcohol/week: 3.0 standard drinks of alcohol     Types:  "3 Cans of beer per week    Drug use: Never    Sexual activity: Defer         FAMHX  Family History   Problem Relation Age of Onset    Diabetes Mother     Diabetes Father     Malig Hyperthermia Neg Hx           ALLERGY  No Known Allergies     MEDSCURRENT    Current Outpatient Medications:     aspirin 325 MG EC tablet, Take 1 tablet by mouth Every 6 (Six) Hours As Needed for Mild Pain., Disp: , Rfl:     citalopram (CeleXA) 40 MG tablet, Take 1 tablet by mouth Every Morning., Disp: , Rfl:     losartan (COZAAR) 100 MG tablet, Take 1 tablet by mouth Daily. INST PER ANESTHESIA PROTOCOL, Disp: , Rfl:     pantoprazole (Protonix) 20 MG EC tablet, Take 1 tablet by mouth Daily., Disp: 30 tablet, Rfl: 1    pramipexole (MIRAPEX) 0.5 MG tablet, Take 1 tablet by mouth Daily As Needed., Disp: , Rfl:     vitamin D3 125 MCG (5000 UT) capsule capsule, , Disp: , Rfl:     hydroCHLOROthiazide (HYDRODIURIL) 25 MG tablet, Take 1 tablet by mouth Daily., Disp: 90 tablet, Rfl: 3    oxyCODONE-acetaminophen (Percocet) 5-325 MG per tablet, Take 1 tablet by mouth Every 6 (Six) Hours As Needed for Moderate Pain., Disp: 8 tablet, Rfl: 0    polyethylene glycol (MIRALAX) 17 g packet, Take 17 g by mouth Daily., Disp: 5 packet, Rfl: 0      Review of Systems   Constitutional: Positive for malaise/fatigue.   HENT: Negative.     Eyes: Negative.    Cardiovascular:  Positive for chest pain.   Respiratory:  Positive for sleep disturbances due to breathing.    Endocrine: Negative.    Hematologic/Lymphatic: Negative.    Skin: Negative.    Musculoskeletal: Negative.    Gastrointestinal: Negative.    Genitourinary: Negative.    Neurological: Negative.    Psychiatric/Behavioral: Negative.          Objective     /87   Pulse 68   Ht 190.5 cm (75\")   Wt 135 kg (298 lb)   BMI 37.25 kg/m²       General Appearance:   well developed  well nourished, obese  HENT:   oropharynx moist  lips not cyanotic  Neck:  thyroid not enlarged  supple  Respiratory:  no " respiratory distress  normal breath sounds  no rales  Cardiovascular:  no jugular venous distention  regular rhythm  apical impulse normal  S1 normal, S2 normal  no S3, no S4   no murmur  no rub, no thrill  carotid pulses normal; no bruit  pedal pulses normal  lower extremity edema: none    Musculoskeletal:  no clubbing of fingers.   normocephalic, head atraumatic  Skin:   warm, dry  Psychiatric:  judgement and insight appropriate  normal mood and affect      Result Review :     The following data was reviewed by: Reyes Guo MD on 12/18/2023:    CMP          11/4/2023    12:43   CMP   Glucose 173    BUN 19    Creatinine 0.99    EGFR 90.0    Sodium 141    Potassium 4.5    Chloride 105    Calcium 9.3    Total Protein 7.2    Albumin 4.6    Globulin 2.6    Total Bilirubin 0.4    Alkaline Phosphatase 92    AST (SGOT) 26    ALT (SGPT) 56    Albumin/Globulin Ratio 1.8    BUN/Creatinine Ratio 19.2    Anion Gap 8.0      CBC          11/4/2023    12:43   CBC   WBC 7.30    RBC 4.91    Hemoglobin 14.2    Hematocrit 43.0    MCV 87.6    MCH 28.9    MCHC 33.0    RDW 12.8    Platelets 386            Data reviewed : Emergency room records reviewed, EKG reviewed from November that showed sinus rhythm rate 60, normal EKG, chest x-ray showed no acute disease, no previous EKG for comparison      Procedures             Assessment and Plan        ASSESSMENT:  Encounter Diagnoses   Name Primary?    FIDENCIO (obstructive sleep apnea) Yes    Hypersomnolence     Precordial pain     Hypertension, essential          PLAN:    1.  Precordial pain-his risk factors include hypertension and family history.  This may have been induced by uncontrolled hypertension.  His emergency room workup was negative.  A stress test will be scheduled for further evaluation.  2.  Hypersomnolence with known FIDENCIO-it sounds like his CPAP settings are ineffective and he continues to have apnea despite this.  I am referring to sleep medicine for evaluation  and possibly titration study.  3.  Essential hypertension-mildly uncontrolled based on home readings.  I am adding HCTZ to his ARB.  Check basic metabolic panel in 2 weeks.  Blood pressure check in January will be scheduled.  In addition he will need further counseling regarding a sensible approach to weight loss.    He is agreeable with this plan, we will discuss diagnostic results when available      Patient was given instructions and counseling regarding his condition or for health maintenance advice. Please see specific information pulled into the AVS if appropriate.             ELIZABETH Guo MD  12/18/2023    13:30 EST

## 2024-05-03 ENCOUNTER — OFFICE VISIT (OUTPATIENT)
Dept: SLEEP MEDICINE | Facility: HOSPITAL | Age: 54
End: 2024-05-03
Payer: COMMERCIAL

## 2024-05-03 VITALS
SYSTOLIC BLOOD PRESSURE: 140 MMHG | WEIGHT: 297.7 LBS | HEART RATE: 61 BPM | OXYGEN SATURATION: 96 % | DIASTOLIC BLOOD PRESSURE: 90 MMHG | BODY MASS INDEX: 37.01 KG/M2 | HEIGHT: 75 IN

## 2024-05-03 DIAGNOSIS — E66.09 CLASS 2 OBESITY DUE TO EXCESS CALORIES WITHOUT SERIOUS COMORBIDITY WITH BODY MASS INDEX (BMI) OF 37.0 TO 37.9 IN ADULT: ICD-10-CM

## 2024-05-03 DIAGNOSIS — G47.33 OBSTRUCTIVE SLEEP APNEA, ADULT: Primary | ICD-10-CM

## 2024-05-03 PROBLEM — E66.812 CLASS 2 OBESITY DUE TO EXCESS CALORIES WITHOUT SERIOUS COMORBIDITY WITH BODY MASS INDEX (BMI) OF 37.0 TO 37.9 IN ADULT: Status: ACTIVE | Noted: 2024-05-03

## 2024-05-03 PROCEDURE — G0463 HOSPITAL OUTPT CLINIC VISIT: HCPCS

## 2024-05-03 NOTE — PROGRESS NOTES
Reason for Consultation: Apnea on CPAP        Patient Care Team:  Michael Barnes APRN as PCP - General (Family Medicine)  Raoul Villalpando MD as Consulting Physician (General Surgery)  Lawson Adams MD, MPH as Consulting Physician (Sleep Medicine)      History of present illness:    Thank you for asking me to see your patient.  The patient is a 53 y.o. male was diagnosed with sleep apnea  Outside [facility] approximately 10 years ago.  Unfortunately at the time there insurance would not pay for CPAP equipment so they pay cash for it.  They had an old [employer] machine that has not been updated.  His wife complains that when he does not wear it he snores and he is extremely sleepy, upcoming week and being sleepy while he is driving.  He drives an hour and a half to work and it is fraught with some degree of hazard because he is so sleepy.  He is probably gained 30 to 40 pounds over the past 5 years.  He snores loudly and in all positions.  He says the reason he does not wear his CPAP mask is good he likes to sleep on his side and when he does so the has a big leak into his eye which keeps him from sleeping.  He is trying to learn to sleep on his back but it is not his preferred position.  We did get a compliance download and it appears that his AutoSet pressure is between 5 and 20 and he is taking a auto PAP mean pressure of 7.9 cm with 90 percentile pressure being 10.9.  Average time in large leak is an hour and 15 minutes and 48 seconds.  Residual AHI is 6.1.    He recalls that his AHI was about 42 on that study that we do not have a copy of.    [place]: 18    Data Reviewed: Reviewed her compliance download and is sleep medicine questionnaire      PMH:  Past Medical History:   Diagnosis Date    Acid reflux     Hypertension 2020    Restless leg     Sleep apnea     Ventral hernia     UMBILICAL AND VENTRAL          Allergies:  Patient has no known  "allergies.     Medication Review:   Current Outpatient Medications on File Prior to Visit   Medication Sig Dispense Refill    citalopram (CeleXA) 40 MG tablet Take 1 tablet by mouth Every Morning.      losartan (COZAAR) 100 MG tablet Take 1 tablet by mouth Daily. INST PER ANESTHESIA PROTOCOL      aspirin 325 MG EC tablet Take 1 tablet by mouth Every 6 (Six) Hours As Needed for Mild Pain.      hydroCHLOROthiazide (HYDRODIURIL) 25 MG tablet Take 1 tablet by mouth Daily. 90 tablet 3    oxyCODONE-acetaminophen (Percocet) 5-325 MG per tablet Take 1 tablet by mouth Every 6 (Six) Hours As Needed for Moderate Pain. 8 tablet 0    polyethylene glycol (MIRALAX) 17 g packet Take 17 g by mouth Daily. 5 packet 0    pramipexole (MIRAPEX) 0.5 MG tablet Take 1 tablet by mouth Daily As Needed.      vitamin D3 125 MCG (5000 UT) capsule capsule        No current facility-administered medications on file prior to visit.         Vital Signs:    Vitals:    05/03/24 0900   BP: 140/90   Pulse: 61   SpO2: 96%   Weight: 135 kg (297 lb 11.2 oz)   Height: 190.5 cm (75\")        Body mass index is 37.21 kg/m².  Neck Circumference: 18 inches      Physical Exam:    Constitutional:  Well developed 53 y.o. male that appears in no apparent distress.  Awake & oriented times 3.  Normal mood with normal recent and remote memory and normal judgement.  Eyes:  Conjunctivae normal.  Oropharynx: Moist mucous membranes without exudate and Mallampati 4 with macroglossia  Neck: Trachea midline  Respiratory: Effort is not labored  Cardiovascular: Radial pulse regular  Musculoskeletal: Gait appears normal, no digital clubbing evident, no pre-tibial edema        Impression:   Encounter Diagnoses   Name Primary?    Obstructive sleep apnea, adult Yes    Class 2 obesity due to excess calories without serious comorbidity with body mass index (BMI) of 37.0 to 37.9 in adult      Patient's BMI is Body mass index is 37.21 kg/m².    Will continue to do study to get him " equipment from [facility].  I ordered an in lab split-night polysomnogram which would give us the opportunity to determine the best pressure and mask fit.    Plan:    Polysomnogram, split-night.  In the meantime are sleep tech brought up a couple fullface mask for him to try until we can get him a new machine/mask combination.    The patient should practice good sleep hygiene measures.      Weight loss might be beneficial in this patient who has a Body mass index is 37.21 kg/m².      Pathophysiology of FIDENCIO described to the patient.  Cardiovascular complications of untreated FIDENCIO also reviewed.      The patient was cautioned about the dangers of drowsy driving.    Alejandro Adams MD  Sleep Medicine  05/03/24  10:25 EDT

## 2024-06-14 DIAGNOSIS — G47.33 OBSTRUCTIVE SLEEP APNEA, ADULT: Primary | ICD-10-CM

## 2024-06-14 DIAGNOSIS — E66.09 CLASS 2 OBESITY DUE TO EXCESS CALORIES WITHOUT SERIOUS COMORBIDITY WITH BODY MASS INDEX (BMI) OF 37.0 TO 37.9 IN ADULT: ICD-10-CM

## 2024-07-05 ENCOUNTER — HOSPITAL ENCOUNTER (OUTPATIENT)
Dept: SLEEP MEDICINE | Facility: HOSPITAL | Age: 54
End: 2024-07-05
Payer: COMMERCIAL

## 2024-07-05 DIAGNOSIS — G47.33 OBSTRUCTIVE SLEEP APNEA, ADULT: ICD-10-CM

## 2024-07-05 DIAGNOSIS — E66.09 CLASS 2 OBESITY DUE TO EXCESS CALORIES WITHOUT SERIOUS COMORBIDITY WITH BODY MASS INDEX (BMI) OF 37.0 TO 37.9 IN ADULT: ICD-10-CM

## 2024-07-05 PROCEDURE — 95806 SLEEP STUDY UNATT&RESP EFFT: CPT

## 2024-07-17 DIAGNOSIS — G47.33 OBSTRUCTIVE SLEEP APNEA, ADULT: Primary | ICD-10-CM

## 2024-07-17 DIAGNOSIS — E66.09 CLASS 2 OBESITY DUE TO EXCESS CALORIES WITHOUT SERIOUS COMORBIDITY WITH BODY MASS INDEX (BMI) OF 37.0 TO 37.9 IN ADULT: ICD-10-CM

## 2024-07-30 DIAGNOSIS — E66.09 CLASS 2 OBESITY DUE TO EXCESS CALORIES WITHOUT SERIOUS COMORBIDITY WITH BODY MASS INDEX (BMI) OF 37.0 TO 37.9 IN ADULT: Primary | ICD-10-CM

## 2024-07-30 DIAGNOSIS — G47.33 OBSTRUCTIVE SLEEP APNEA, ADULT: ICD-10-CM

## 2024-08-09 ENCOUNTER — TELEPHONE (OUTPATIENT)
Dept: SLEEP MEDICINE | Facility: HOSPITAL | Age: 54
End: 2024-08-09
Payer: COMMERCIAL

## 2024-10-11 ENCOUNTER — OFFICE VISIT (OUTPATIENT)
Dept: SLEEP MEDICINE | Facility: HOSPITAL | Age: 54
End: 2024-10-11
Payer: COMMERCIAL

## 2024-10-11 VITALS
HEIGHT: 75 IN | DIASTOLIC BLOOD PRESSURE: 87 MMHG | WEIGHT: 294 LBS | SYSTOLIC BLOOD PRESSURE: 138 MMHG | BODY MASS INDEX: 36.56 KG/M2 | HEART RATE: 68 BPM | OXYGEN SATURATION: 96 %

## 2024-10-11 DIAGNOSIS — I10 ESSENTIAL HYPERTENSION: ICD-10-CM

## 2024-10-11 DIAGNOSIS — G47.33 OBSTRUCTIVE SLEEP APNEA, ADULT: Primary | ICD-10-CM

## 2024-10-11 DIAGNOSIS — E66.812 CLASS 2 SEVERE OBESITY WITH SERIOUS COMORBIDITY AND BODY MASS INDEX (BMI) OF 36.0 TO 36.9 IN ADULT, UNSPECIFIED OBESITY TYPE: ICD-10-CM

## 2024-10-11 DIAGNOSIS — G47.19 EXCESSIVE DAYTIME SLEEPINESS: ICD-10-CM

## 2024-10-11 DIAGNOSIS — Z78.9 INTOLERANCE OF CONTINUOUS POSITIVE AIRWAY PRESSURE (CPAP) VENTILATION: ICD-10-CM

## 2024-10-11 DIAGNOSIS — E66.01 CLASS 2 SEVERE OBESITY WITH SERIOUS COMORBIDITY AND BODY MASS INDEX (BMI) OF 36.0 TO 36.9 IN ADULT, UNSPECIFIED OBESITY TYPE: ICD-10-CM

## 2024-10-11 DIAGNOSIS — Z91.89 AT RISK FOR EXTREME OBESITY WITH ALVEOLAR HYPOVENTILATION: ICD-10-CM

## 2024-10-11 DIAGNOSIS — G47.34 SLEEP RELATED HYPOXIA: ICD-10-CM

## 2024-10-11 PROCEDURE — G0463 HOSPITAL OUTPT CLINIC VISIT: HCPCS

## 2024-10-11 NOTE — PROGRESS NOTES
[address] [place], [place] [address]  Phone: [phone]  Fax: [phone]      SLEEP CLINIC FOLLOW UP PROGRESS NOTE.    George Steel  [id number]   1970  54 y.o.  male      PCP: Michael Barnes APRN      Date of visit: 10/11/2024    Chief Complaint   Patient presents with    Sleep Apnea       Medications and allergies are reviewed by me and documented in the encounter.     SOCIAL (habits pertaining to sleep medicine)  History tobacco use:No  History of alcohol use: 5 per week  Caffeine use: 4 beverages/d    HPI:  This is a 54 y.o. PMHx HTN, GERD, obesity, RLS (on Mirapex 0.5 mg on same for many years takes about 3x a week), obesity.  Here for management of obstructive sleep apnea (SHUKRI 91/h with sleep-related hypoxia on 7/7/2024 HST).. Patient is using positive airway pressure therapy and the symptoms of sleep apnea have NOT improved on the therapy. Normally patient goes to bed at 930 PM and wakes up at 5 AM .  The patient wakes up 3 time(s) during the night and has no problem going back to sleep.  Feels refreshed after waking up.     Overall patient's Impression of their PAP therapy is: NOT well  Air pressures too much  Air pressures too little   Not a mask issues no issues with FFM     Compliance data as reviewed by me with patient room today:  Date range  - 9/10/2024 - 10/9/2024  Overall use 100%  Former 97%  Average days used 6 hours 59 minutes  Device AirSense 11 AutoSet  Since 5-15 cm H2O EPR 2  95th percentile pressure is 12.8 cm H2O  Maximum pressure 13.9 cm H2O  95th percentile leak is 14.9 LPM  AHI is 1.4  DME:[facility]  [place], [place] (he called his wife who confirmed this for him)  Mask used: FFM - no issues     Save what is noted above in HPI, denies any OTHER past known:  cardiopulmonary conditions/neurologic disorders/neuromuscular disorders  Never needed supplemental O2 at home  Denies any opioid therapy  Denies any metal in head/neck/chest    -Obesity  Wt  "Readings from Last 3 Encounters:   10/11/24 133 kg (294 lb)   05/03/24 135 kg (297 lb 11.2 oz)   12/18/23 135 kg (298 lb)       -Restless leg syndrome-endorses well-controlled  On Mirapex 0.5 mg 3 days per week has been on it for several years  Managed by PCP  No augmentation  -Last seen in sleep clinic~5 months ago on 5/3/2024 by Dr. Adams history of sleep apnea and diagnosed 10 years ago, gained 30-40 pounds over the past 5 years, a split study was ordered, refused by insurance, and HST was then ordered, recommendation will was made for titration, looks like that titration was refused in place and was placed on auto-CPAP.    REVIEW OF SYSTEMS:   Is negative unless otherwise noted in HPI  [place] Sleepiness Scale :Total score: 11     Disclaimer History: The above history is based on this sleep physician's in room encounter with the patient. Pre encounter self administered questionnaires are taken into consideration and discussed with patient for any discordance. The above documentation by this sleep physician is the most accurate clinical information determined by in room sleep physician encounter with patient.     PHYSICAL EXAMINATION:  Vitals:    10/11/24 1100   BP: 138/87   Pulse: 68   SpO2: 96%   Weight: 133 kg (294 lb)   Height: 190.5 cm (75\")    Body mass index is 36.75 kg/m².   CONSTITUTIONAL: Well appearing, in no overt pain or respiratory distress   ENT Mallampati IV, macroglossia  RESP SYSTEM: Breath sounds are clear bilateral (no Rales/rhonchi/wheezes), no overt respiratory distress, speaks in clear sentences without dyspnea, no accessory muscle use  CARDIOVASULAR: RRR, no rub, no gallop no edema noted  NEURO: Oriented x 3, no gross focal deficits     Records reviewed    -Last seen in sleep clinic~5 months ago on 5/3/2024 by Dr. Adams history of sleep apnea and diagnosed 10 years ago, gained 30-40 pounds over the past 5 years, a split study was ordered, refused by insurance, and HST was then ordered, " recommendation will was made for titration, looks like that titration was refused in place and was placed on auto-CPAP.    Labs reviewed  -11/4/2023   bicarb 28    Diagnostics reviewed    Compliance data as reviewed by me with patient room today:  Date range  - 9/10/2024 - 10/9/2024  Overall use 100%  Former 97%  Average days used 6 hours 59 minutes  Device AirSense 11 AutoSet  Since 5-15 cm H2O EPR 2  95th percentile pressure is 12.8 cm H2O  Maximum pressure 13.9 cm H2O  95th percentile leak is 14.9 LPM  AHI is 1.4  DME:[facility]  [place], [place]  Mask used: FFM - no issues         -7/7/2024 HST not readily available for me on G3  RER 91/h  O2 adams 82%  Oximetry minutes less than 89% 22.9 minutes  Longest consecutive time under 88% was 0.5 minutes  Recommendation by interpreting physician was titration study  Interpreting physician was Dr. Adams.  Titration was ordered by Dr. Adams refused by insurance   Independent conclusions from review reported interpretation: Clinically correlated sleep-related hypoxia versus hypoventilation cannot be ruled out without out of center sleep study and these results. Titration is medically necessary especially with residual excessive daytime sleepiness on CPAP.     ASSESSMENT AND PLAN:  Obstructive sleep apnea ( G 47.33).    Sleep-related hypoxia versus  At risk for extreme obesity with alveolar hypoventilation   Excessive daytime sleepiness  Intolerance of continuous positive airway pressure (CPAP) ventilation [Z78.9]    -Positive Airway Pressure Titration ordered to guide management of sleep disordered breathing:   Potentially this patient would be an ideal patient for a bilevel without a backup rate - titration per protocol to guide management.    If there is any refusal by the insurance provider to titration study I will be documenting the insurance provider is negligent and placing the patient at unnecessary harm and delay of care to medically necessary  testing/treatment against the orders of a board certified sleep medicine physician    Patient's symptoms and examination is consistent with sleep apnea. have talked to the patient about the signs and symptoms of sleep apnea. In addition, I have also discussed pathophysiology of sleep apnea.  I also discussed the complications of untreated sleep apnea including effects on hypertension, diabetes mellitus and nonrestorative sleep with hypersomnia which can increase risk for motor vehicle accidents.  Untreated sleep apnea is also a risk factor for development of atrial fibrillation, hypertension, insulin resistance and cerebrovascular accident. Counseled no driving or operating heavy machinery while sleepy. Patient was given opportunity to ask questions and all the questions were answered.  - The patient will have continued use and benefit and current device is medically necessary until have guidance from titration.  Order for supplies sent in.    Obesity, with BMI is Body mass index is 36.75 kg/m².. We had a long discussion on weight loss goal will be calorie deficit through healthy sustainable lifestyle modifications. Counseled weight loss will be beneficial for reduction in severity of sleep apnea, healthy diet/exercise to achieve same, follow up with primary care physician for serial monitoring and to further guide management.  RLS, well-controlled on Mirapex-can continue with his prescribing clinician, no augmentation and low risk for augmentation with this level of the dose.  Counseled patient compliance with PAP therapy and actively treating sleep disordered breathing will be beneficial for this comorbid condition    Follow up after titration . Patient's questions were answered.    Time based visit 40 minutes: to include in room history/exam/extensive counseling/review of plan with patient/review of labs/review of diagnostics/independent conclusions drawn from prior diagnostics sleep related hypoxia vs  hypoventilation titration medically necessary/ review of prior medical records / complex medical decision making leading to order for Titration study      EMR Dragon/Transcription disclaimer:   Much of this encounter note is an electronic transcription/translation of spoken language to printed text. The electronic translation of spoken language may permit erroneous, or at times, nonsensical words or phrases to be inadvertently transcribed; Although I have reviewed the note for such errors, some may still exist.       NPI #: [id number]    Yajaira Win, DO  Sleep Medicine  [facility]  10/11/24

## 2024-11-11 ENCOUNTER — HOSPITAL ENCOUNTER (OUTPATIENT)
Dept: SLEEP MEDICINE | Facility: HOSPITAL | Age: 54
Discharge: HOME | End: 2024-11-11
Admitting: FAMILY MEDICINE
Payer: COMMERCIAL

## 2024-11-11 DIAGNOSIS — G47.34 SLEEP RELATED HYPOXIA: ICD-10-CM

## 2024-11-11 DIAGNOSIS — G47.19 EXCESSIVE DAYTIME SLEEPINESS: ICD-10-CM

## 2024-11-11 DIAGNOSIS — Z91.89 AT RISK FOR EXTREME OBESITY WITH ALVEOLAR HYPOVENTILATION: ICD-10-CM

## 2024-11-11 DIAGNOSIS — Z78.9 INTOLERANCE OF CONTINUOUS POSITIVE AIRWAY PRESSURE (CPAP) VENTILATION: ICD-10-CM

## 2024-11-11 DIAGNOSIS — G47.33 OBSTRUCTIVE SLEEP APNEA, ADULT: ICD-10-CM

## 2024-11-11 PROCEDURE — 95811 POLYSOM 6/>YRS CPAP 4/> PARM: CPT

## 2024-11-18 DIAGNOSIS — I10 ESSENTIAL HYPERTENSION: ICD-10-CM

## 2024-11-18 DIAGNOSIS — G47.33 OBSTRUCTIVE SLEEP APNEA, ADULT: Primary | ICD-10-CM

## 2024-11-19 ENCOUNTER — TELEPHONE (OUTPATIENT)
Dept: SLEEP MEDICINE | Facility: HOSPITAL | Age: 54
End: 2024-11-19
Payer: COMMERCIAL

## 2025-02-07 ENCOUNTER — OFFICE VISIT (OUTPATIENT)
Dept: SLEEP MEDICINE | Facility: HOSPITAL | Age: 55
End: 2025-02-07
Payer: COMMERCIAL

## 2025-02-07 VITALS
BODY MASS INDEX: 36.06 KG/M2 | DIASTOLIC BLOOD PRESSURE: 78 MMHG | HEART RATE: 69 BPM | OXYGEN SATURATION: 98 % | SYSTOLIC BLOOD PRESSURE: 130 MMHG | WEIGHT: 290 LBS | HEIGHT: 75 IN

## 2025-02-07 DIAGNOSIS — E66.812 CLASS 2 SEVERE OBESITY WITH SERIOUS COMORBIDITY AND BODY MASS INDEX (BMI) OF 36.0 TO 36.9 IN ADULT, UNSPECIFIED OBESITY TYPE: ICD-10-CM

## 2025-02-07 DIAGNOSIS — I10 ESSENTIAL HYPERTENSION: ICD-10-CM

## 2025-02-07 DIAGNOSIS — G47.33 OBSTRUCTIVE SLEEP APNEA, ADULT: Primary | ICD-10-CM

## 2025-02-07 DIAGNOSIS — E66.01 CLASS 2 SEVERE OBESITY WITH SERIOUS COMORBIDITY AND BODY MASS INDEX (BMI) OF 36.0 TO 36.9 IN ADULT, UNSPECIFIED OBESITY TYPE: ICD-10-CM

## 2025-02-07 DIAGNOSIS — G25.81 RESTLESS LEG SYNDROME: ICD-10-CM

## 2025-02-07 PROCEDURE — G0463 HOSPITAL OUTPT CLINIC VISIT: HCPCS

## 2025-02-07 NOTE — PROGRESS NOTES
[address] [place] [address]  Phone: [phone]  Fax: [phone]      SLEEP CLINIC FOLLOW UP PROGRESS NOTE.    George Steel  [id number]   1970  54 y.o.  male      PCP: Michael Barnes, DIANA      Date of visit: 2/7/2025    Chief Complaint   Patient presents with    Sleep Apnea       Medications and allergies are reviewed by me and documented in the encounter.     SOCIAL (habits pertaining to sleep medicine)  History tobacco use:No  History of alcohol use: 0 per week  Caffeine use: 5 beverages/d    HPI:  This is a 54 y.o. PMHx HTN, RLS (on mirapex by his PCP 0.5 mg- doing well no augmentation). Here for management of obstructive sleep apnea (SHUKRI 91/h with sleep-related hypoxia on 7/7/2024 HST; s/p titration study on 11/11/2024 with oxygenation adequate on PAP started on auto-bilevel).. Patient is using positive airway pressure therapy and the symptoms of sleep apnea have improved significantly on the therapy. Normally patient goes to bed at 930 PM and wakes up at 530 AM .  The patient wakes up 2 time(s) during the night and has no problem going back to sleep.  Feels refreshed after waking up.     Overall patient's Impression of their PAP therapy is: Going great  Air pressures very comfortable with switch BiLevel as compared to CPAP   AHI looking great  Current mask by descriptor sounds like AirFit F20 FFM   Denies any non-MRI compatible metal to head/neck/chest  No PPM  No AICD  No insulin pump  No cochlear implant    He's got goal of aim for 7 hours nightly sleep his wife motivates him towards same     Compliance data as reviewed by me with patient room today:  Date range 1/5/2025 - 2/3/2025  Overall use 93%  4 Hour tl 93%  Average days used 6 hours 59 minutes  Device air curve 10V-auto  Max IPAP 25 cm H2O  Min EPAP 8 cm H2O  Pressure support 4 cm H2O  95th percentile EPAP 12.1 cm H2O  95th percentile IPAP 16.1 cm H2O  95th percentile leak 44.7 LPM  AHI 1.9-at goal  DME:  [employer]  Mask used: FFM - covers brdige of nose and mouth silicone interface - discomfort     - BP in sleep clinic 130/78  Compliant with home therapies reviewed  States he feels well today    - Obesity   We discussed exercise has a goal to get back on the treadmill used to like to run   Played golf last week   Working on diet as well cognizant of caloric deficiet through diet states less french fries more carrots very motivated patient towards self care  Body mass index is 36.25 kg/m².  Wt Readings from Last 3 Encounters:   02/07/25 132 kg (290 lb)   10/11/24 133 kg (294 lb)   05/03/24 135 kg (297 lb 11.2 oz)     Patient's (Body mass index is 36.25 kg/m².) indicates that they are morbidly obese (BMI > 40 or > 35 with obesity - related health condition) with health related conditions that include obstructive sleep apnea and hypertension . Weight is improving with lifestyle modifications. BMI is is above average; BMI management plan is completed. We discussed portion control and increasing exercise.       -RLS   On mirapex by his PCP 0.5 mg takes it prn since on BiLevel down from 3x /week to 2x/week  Symptoms endorsed as well controlled  NO augmentation      -Last seen in sleep clinic~4 months ago 2024.  Pressure issues sleep-related hypoxia versus hypoventilation cannot be ruled out with out of center sleep test.  His sleep is disrupted needed medicine for sleep test.  Titration was ordered.      REVIEW OF SYSTEMS:   Is negative unless otherwise noted in HPI  [place] Sleepiness Scale :Total score: 9   No near miss or MVC 2/2 sleepiness    Disclaimer History: The above history is based on this sleep physician's in room encounter with the patient. Pre encounter self administered questionnaires are taken into consideration and discussed with patient for any discordance. The above documentation by this sleep physician is the most accurate clinical information determined by in room sleep physician encounter with patient.  "    PHYSICAL EXAMINATION:  Vitals:    02/07/25 1300   BP: 130/78   BP Location: Left arm   Patient Position: Sitting   Pulse: 69   SpO2: 98%   Weight: 132 kg (290 lb)   Height: 190.5 cm (75\")    Body mass index is 36.25 kg/m².   CONSTITUTIONAL: Well appearing, in no overt pain or respiratory distress   NOSE: No septal defect  RESP SYSTEM:  No overt respiratory distress, speaks in clear sentences without dyspnea, no accessory muscle use  CARDIOVASULAR: No edema noted  NEURO: Oriented x 3, no gross focal deficits   Psychiatric: Very pleasant, affect appropriate for visit full range, goal oriented    Compliance data as reviewed by me with patient room today:  Date range 1/5/2025 - 2/3/2025  Overall use 93%  4 Hour tl 93%  Average days used 6 hours 59 minutes  Device air curve 10V-auto  Max IPAP 25 cm H2O  Min EPAP 8 cm H2O  Pressure support 4 cm H2O  95th percentile EPAP 12.1 cm H2O  95th percentile IPAP 16.1 cm H2O  95th percentile leak 44.7 LPM  AHI 1.9-at goal  DME: [employer]  Mask used: FFM - covers brdige of nose and mouth silicone interface - discomfort     ASSESSMENT AND PLAN:  Obstructive sleep apnea ( G 47.33).    -Specific Changes made by me today:  I. After review of compliance data, in visit clinical correlation, and through shared decision making: will not make any changes to PAP therapy settings.  II. Order placed for mask fitting with AirTouch F20 Full Face Mask (the patient may choose any type of mask he wants)  III. Patient requested annual follow up AHI at goal blood pressure at goal I see no reason to emma him otherwise. Counseled patient to follow-up with me in 1 year for therapy review.  Counseled may request sooner follow-up to sleep clinic anytime the patient feels necessary.  The symptoms of sleep apnea have improved with the device and the treatment.  Patient's compliance with the device is excellent for treatment of sleep apnea.  I have independently reviewed the smart card down load and " discussed with the patient the download data and encouarged the patient to continue to use the device.The residual AHI is acceptable. The device is benefiting the patient and the device is medically necessary.  Without proper control of sleep apnea and good compliance there is a increased risk for hypertension, diabetes mellitus and nonrestorative sleep with hypersomnia which can increase risk for motor vehicle accidents.  Untreated sleep apnea is also a risk factor for development of atrial fibrillation, pulmonary hypertension, insulin resistance and stroke. The patient is also instructed to get the supplies from the [facility] and and change them on a regular basis.  A prescription for supplies has been sent to the [facility].  I have also discussed the good sleep hygiene habits and adequate amount of sleep needed for good health.  Obesity,  with BMI is Body mass index is 36.25 kg/m².. Counseled weight loss will be beneficial for reduction in severity of sleep apnea, healthy diet/exercise to achieve same, follow up with primary care physician for serial monitoring and to further guide management.  RLS, Continue mirapex with PCP - very low dose low frequency NO augmentation and low risk for augmentation doing great. I see no need to river a ferritin for well controlled symptoms. We discussed lifestyle modifications and tonic water. Counseled patient PAP therapy compliance  for treatment of sleep apnea potentially beneficial towards this comorbid condition  Essential hypertension [I10], Compliant wit home therapies reviewed.  Counseled patient PAP therapy compliance for treatment of obstructive sleep apnea may be beneficial for this comorbid condition.  Follow-up with PCP as previous for hypertension      Follow up in 1 year . Patient's questions were answered.        EMR Dragon/Transcription disclaimer:   Much of this encounter note is an electronic transcription/translation of spoken language to printed text.  The electronic translation of spoken language may permit erroneous, or at times, nonsensical words or phrases to be inadvertently transcribed; Although I have reviewed the note for such errors, some may still exist.       NPI #: [id number]    Yajaira Win,   Sleep Medicine  [facility]  02/07/25

## (undated) DEVICE — Device: Brand: DEFENDO AIR/WATER/SUCTION AND BIOPSY VALVE

## (undated) DEVICE — SOLIDIFIER LIQLOC PLS 1500CC BT

## (undated) DEVICE — LINER SURG CANSTR SXN S/RIGD 1500CC

## (undated) DEVICE — SINGLE-USE BIOPSY FORCEPS: Brand: RADIAL JAW 4

## (undated) DEVICE — SOL IRRG H2O PL/BG 1000ML STRL

## (undated) DEVICE — BLCK/BITE BLOX WO/DENTL/RIM W/STRAP 54F

## (undated) DEVICE — Device

## (undated) DEVICE — SOL IRR H2O BTL 1000ML STRL

## (undated) DEVICE — CONN JET HYDRA H20 AUXILIARY DISP